# Patient Record
Sex: MALE | Race: BLACK OR AFRICAN AMERICAN | Employment: PART TIME | URBAN - METROPOLITAN AREA
[De-identification: names, ages, dates, MRNs, and addresses within clinical notes are randomized per-mention and may not be internally consistent; named-entity substitution may affect disease eponyms.]

---
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
## 2025-05-05 NOTE — ED PROVIDER NOTES
Emergency Department Provider Note       PCP: No primary care provider on file.   Age: 18 y.o.   Sex: male     DISPOSITION Decision To Discharge 01/01/2024 03:43:28 AM       ICD-10-CM    1. Injury of left wrist, initial encounter  S69.92XA [facility]          Medical Decision Making     Complexity of Problems Addressed:  1 stable acute uncomplicated injury.    Data Reviewed and Analyzed:  I independently ordered and reviewed each unique test.         I interpreted the X-rays X-ray of the left radius and ulna with no acute fracture or dislocation noted.  Agree with radiology.    Discussion of management or test interpretation.  18-year-old male presents with complaint of left wrist, left forearm pain status post mechanical trip and fall landing on elbow/wrist.  Denies any head or loss of conscious.  States that he was never bitten by dogs that were chasing him.  Radial pulse 2+.  Limited range of motion of left wrist secondary to pain.  No significant swelling noted.  NVID.   strength out of 5.  Full range of motion of left elbow.  Tetanus up-to-date.  X-ray with no acute fracture or dislocation.  No snuffbox tenderness.  Velcro wrist splint applied to left wrist.  Will discharge home with pain control, bacitracin.  Additionally will refer to [facility] for follow-up appointment.  Patient given return precautions.        Risk of Complications and/or Morbidity of Patient Management:  Prescription drug management performed.  Shared medical decision making was utilized in creating the patients health plan today.    ED Course as of 01/01/24 0357   Mon Jan 01, 2024   0332 X-ray L radius and ulna    FINDINGS: There is no evidence of fracture or other acute bony abnormality in  the left forearm. The wrist and elbow are also unremarkable.     IMPRESSION: No radiographic evidence of acute fracture or dislocation   [DF]      ED Course User Index  [DF] 
Admission